# Patient Record
Sex: FEMALE | Race: AMERICAN INDIAN OR ALASKA NATIVE
[De-identification: names, ages, dates, MRNs, and addresses within clinical notes are randomized per-mention and may not be internally consistent; named-entity substitution may affect disease eponyms.]

---

## 2021-01-09 ENCOUNTER — HOSPITAL ENCOUNTER (EMERGENCY)
Dept: HOSPITAL 5 - ED | Age: 31
Discharge: HOME | End: 2021-01-09
Payer: COMMERCIAL

## 2021-01-09 VITALS — SYSTOLIC BLOOD PRESSURE: 127 MMHG | DIASTOLIC BLOOD PRESSURE: 83 MMHG

## 2021-01-09 DIAGNOSIS — O21.8: Primary | ICD-10-CM

## 2021-01-09 DIAGNOSIS — Z3A.11: ICD-10-CM

## 2021-01-09 DIAGNOSIS — R19.7: ICD-10-CM

## 2021-01-09 DIAGNOSIS — O99.611: ICD-10-CM

## 2021-01-09 LAB
ALBUMIN SERPL-MCNC: 4.5 G/DL (ref 3.9–5)
ALT SERPL-CCNC: 21 UNITS/L (ref 7–56)
BILIRUB DIRECT SERPL-MCNC: < 0.2 MG/DL (ref 0–0.2)
BILIRUB UR QL STRIP: (no result)
BLOOD UR QL VISUAL: (no result)
BUN SERPL-MCNC: 6 MG/DL (ref 7–17)
BUN/CREAT SERPL: 10 %
CALCIUM SERPL-MCNC: 9.3 MG/DL (ref 8.4–10.2)
HCT VFR BLD CALC: 40 % (ref 30.3–42.9)
HEMOLYSIS INDEX: 8
HGB BLD-MCNC: 13.8 GM/DL (ref 10.1–14.3)
MCHC RBC AUTO-ENTMCNC: 34 % (ref 30–34)
MCV RBC AUTO: 94 FL (ref 79–97)
MUCOUS THREADS #/AREA URNS HPF: (no result) /HPF
PH UR STRIP: 5 [PH] (ref 5–7)
PLATELET # BLD: 296 K/MM3 (ref 140–440)
PROT UR STRIP-MCNC: (no result) MG/DL
RBC # BLD AUTO: 4.27 M/MM3 (ref 3.65–5.03)
RBC #/AREA URNS HPF: 2 /HPF (ref 0–6)
UROBILINOGEN UR-MCNC: < 2 MG/DL (ref ?–2)
WBC #/AREA URNS HPF: 5 /HPF (ref 0–6)

## 2021-01-09 PROCEDURE — 81001 URINALYSIS AUTO W/SCOPE: CPT

## 2021-01-09 PROCEDURE — 80048 BASIC METABOLIC PNL TOTAL CA: CPT

## 2021-01-09 PROCEDURE — 96361 HYDRATE IV INFUSION ADD-ON: CPT

## 2021-01-09 PROCEDURE — 80076 HEPATIC FUNCTION PANEL: CPT

## 2021-01-09 PROCEDURE — 36415 COLL VENOUS BLD VENIPUNCTURE: CPT

## 2021-01-09 PROCEDURE — 96374 THER/PROPH/DIAG INJ IV PUSH: CPT

## 2021-01-09 PROCEDURE — 84702 CHORIONIC GONADOTROPIN TEST: CPT

## 2021-01-09 PROCEDURE — 83690 ASSAY OF LIPASE: CPT

## 2021-01-09 PROCEDURE — 85027 COMPLETE CBC AUTOMATED: CPT

## 2021-01-09 PROCEDURE — 99283 EMERGENCY DEPT VISIT LOW MDM: CPT

## 2021-01-09 NOTE — EMERGENCY DEPARTMENT REPORT
ED General Adult HPI





- General


Chief complaint: Nausea/Vomiting/Diarrhea


Stated complaint: 11 WEEKS PREGNANT/VOMITTING/DIARREAH


Time Seen by Provider: 21 19:29


Source: patient


Mode of arrival: Ambulatory


Limitations: No Limitations





- History of Present Illness


Initial comments: 





Patient is 30 years old female  2 para 1 with no significant past medical

history.  Patient presented to the ER complaining of nausea, vomiting and watery

diarrhea for the last 3 days.  Patient denied any abdominal pain, fever or 

chills.  Patient also denied any runny nose cough or congestion or shortness of 

breath.





- Related Data


                                    Allergies











Allergy/AdvReac Type Severity Reaction Status Date / Time


 


No Known Allergies Allergy   Unverified 21 18:34














ED Review of Systems


ROS: 


Stated complaint: 11 WEEKS PREGNANT/VOMITTING/DIARREAH


Other details as noted in HPI





Comment: All other systems reviewed and negative


Constitutional: denies: chills, fever


Respiratory: denies: cough, shortness of breath, SOB with exertion, SOB at rest


Cardiovascular: denies: chest pain, palpitations


Gastrointestinal: nausea, vomiting, diarrhea.  denies: abdominal pain, 

constipation, hematemesis, melena, hematochezia


Genitourinary: denies: urgency, dysuria


Musculoskeletal: denies: back pain


Neurological: denies: headache, weakness, numbness, paresthesias, confusion





ED Past Medical Hx





- Past Medical History


Previous Medical History?: No





- Surgical History


Past Surgical History?: No





- Social History


Smoking Status: Never Smoker





ED Physical Exam





- General


Limitations: No Limitations


General appearance: alert, in no apparent distress





- Head


Head exam: Present: atraumatic, normocephalic, normal inspection





- ENT


ENT exam: Present: mucous membranes dry





- Neck


Neck exam: Present: normal inspection, full ROM.  Absent: tenderness, menin

gismus





- Respiratory


Respiratory exam: Present: normal lung sounds bilaterally





- Cardiovascular


Cardiovascular Exam: Present: regular rate, normal rhythm, normal heart sounds





- GI/Abdominal


GI/Abdominal exam: Present: soft, normal bowel sounds.  Absent: distended, 

tenderness, guarding, rebound, rigid, mass, bruit, pulsatile mass, hernia





- Extremities Exam


Extremities exam: Present: normal inspection, full ROM, normal capillary refill.

 Absent: tenderness, pedal edema, calf tenderness





- Back Exam


Back exam: Present: normal inspection, full ROM.  Absent: CVA tenderness (R), 

CVA tenderness (L)





- Neurological Exam


Neurological exam: Present: alert, oriented X3, CN II-XII intact, normal gait, 

reflexes normal.  Absent: motor sensory deficit





- Psychiatric


Psychiatric exam: Present: normal mood





- Skin


Skin exam: Present: warm, intact, normal color





ED Course


                                   Vital Signs











  21





  18:35 20:16


 


Temperature 97.9 F 


 


Pulse Rate 101 H 


 


Respiratory 18 19





Rate  


 


Blood Pressure 127/83 


 


O2 Sat by Pulse 99 98





Oximetry  














ED Medical Decision Making





- Lab Data


Result diagrams: 


                                 21 18:44





                                 21 18:44





- Medical Decision Making





Patient is 30 years old female  2 para 1 with no significant past medical

history.  Patient presented to the ER complaining of nausea, vomiting and watery

diarrhea for the last 3 days.  Patient denied any abdominal pain, fever or 

chills.  Patient also denied any runny nose cough or congestion or shortness of 

breath.





Patient received Zofran and 1 L of normal saline.  Patient stated that she is 

feeling much better no nausea or vomiting.  Labs reviewed and is unremarkable 

except for slightly elevated white blood cells and this is most likely secondary

to pregnancy since patient is not experiencing any fever.  Patient abdomen is 

soft and nontender with no clinical evidence of acute abdomen.  Patient also 

denied any vaginal bleeding or abdominal pain so no ultrasound has been ordered.

 Patient stated that she is having an appointment with her OB doctor in the next

few days.  Patient advised to return to the ER if she develop any symptoms or if

her symptoms get worse.








Critical care attestation.: 


If time is entered above; I have spent that time in minutes in the direct care 

of this critically ill patient, excluding procedure time.








ED Disposition


Clinical Impression: 


 Nausea/vomiting in pregnancy





Disposition: DC-01 TO HOME OR SELFCARE


Is pt being admited?: No


Condition: Stable


Instructions:  Nausea and Vomiting, Adult


Referrals: 


PRIMARY CARE,MD [Referring] - 3-5 Days